# Patient Record
Sex: FEMALE | Race: NATIVE HAWAIIAN OR OTHER PACIFIC ISLANDER | Employment: PART TIME | URBAN - METROPOLITAN AREA
[De-identification: names, ages, dates, MRNs, and addresses within clinical notes are randomized per-mention and may not be internally consistent; named-entity substitution may affect disease eponyms.]

---

## 2017-08-04 ENCOUNTER — HOSPITAL ENCOUNTER (INPATIENT)
Age: 22
LOS: 3 days | Discharge: HOME | DRG: 885 | End: 2017-08-07
Attending: EMERGENCY MEDICINE | Admitting: PSYCHIATRY & NEUROLOGY
Payer: OTHER GOVERNMENT
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
## 2017-08-04 DIAGNOSIS — R45.851 SUICIDAL IDEATION: Primary | ICD-10-CM

## 2017-08-04 PROBLEM — F31.9 BIPOLAR 1 DISORDER (HCC): Status: ACTIVE | Noted: 2017-08-04

## 2017-08-04 LAB
ALBUMIN SERPL BCP-MCNC: 3.9 G/DL (ref 3.4–5)
ALBUMIN/GLOB SERPL: 0.9 {RATIO} (ref 0.8–1.7)
ALP SERPL-CCNC: 66 U/L (ref 45–117)
ALT SERPL-CCNC: 18 U/L (ref 13–56)
AMPHET UR QL SCN: NEGATIVE
ANION GAP BLD CALC-SCNC: 6 MMOL/L (ref 3–18)
APAP SERPL-MCNC: <2 UG/ML (ref 10–30)
AST SERPL W P-5'-P-CCNC: 16 U/L (ref 15–37)
ATRIAL RATE: 74 BPM
BARBITURATES UR QL SCN: NEGATIVE
BASOPHILS # BLD AUTO: 0 K/UL (ref 0–0.1)
BASOPHILS # BLD: 0 % (ref 0–2)
BENZODIAZ UR QL: NEGATIVE
BILIRUB SERPL-MCNC: 0.2 MG/DL (ref 0.2–1)
BUN SERPL-MCNC: 13 MG/DL (ref 7–18)
BUN/CREAT SERPL: 14 (ref 12–20)
CALCIUM SERPL-MCNC: 8.9 MG/DL (ref 8.5–10.1)
CALCULATED P AXIS, ECG09: 50 DEGREES
CALCULATED R AXIS, ECG10: 48 DEGREES
CALCULATED T AXIS, ECG11: 45 DEGREES
CANNABINOIDS UR QL SCN: NEGATIVE
CHLORIDE SERPL-SCNC: 105 MMOL/L (ref 100–108)
CO2 SERPL-SCNC: 30 MMOL/L (ref 21–32)
COCAINE UR QL SCN: NEGATIVE
CREAT SERPL-MCNC: 0.92 MG/DL (ref 0.6–1.3)
DIAGNOSIS, 93000: NORMAL
DIFFERENTIAL METHOD BLD: ABNORMAL
EOSINOPHIL # BLD: 0.1 K/UL (ref 0–0.4)
EOSINOPHIL NFR BLD: 1 % (ref 0–5)
ERYTHROCYTE [DISTWIDTH] IN BLOOD BY AUTOMATED COUNT: 13.6 % (ref 11.6–14.5)
ETHANOL SERPL-MCNC: <3 MG/DL (ref 0–3)
GLOBULIN SER CALC-MCNC: 4.2 G/DL (ref 2–4)
GLUCOSE SERPL-MCNC: 107 MG/DL (ref 74–99)
HCG UR QL: NEGATIVE
HCT VFR BLD AUTO: 39.4 % (ref 35–45)
HDSCOM,HDSCOM: NORMAL
HGB BLD-MCNC: 13.4 G/DL (ref 12–16)
LYMPHOCYTES # BLD AUTO: 13 % (ref 21–52)
LYMPHOCYTES # BLD: 1 K/UL (ref 0.9–3.6)
MCH RBC QN AUTO: 28.5 PG (ref 24–34)
MCHC RBC AUTO-ENTMCNC: 34 G/DL (ref 31–37)
MCV RBC AUTO: 83.7 FL (ref 74–97)
METHADONE UR QL: NEGATIVE
MONOCYTES # BLD: 0.2 K/UL (ref 0.05–1.2)
MONOCYTES NFR BLD AUTO: 3 % (ref 3–10)
NEUTS SEG # BLD: 6.2 K/UL (ref 1.8–8)
NEUTS SEG NFR BLD AUTO: 83 % (ref 40–73)
OPIATES UR QL: NEGATIVE
P-R INTERVAL, ECG05: 132 MS
PCP UR QL: NEGATIVE
PLATELET # BLD AUTO: 259 K/UL (ref 135–420)
PMV BLD AUTO: 10.7 FL (ref 9.2–11.8)
POTASSIUM SERPL-SCNC: 3.4 MMOL/L (ref 3.5–5.5)
PROT SERPL-MCNC: 8.1 G/DL (ref 6.4–8.2)
Q-T INTERVAL, ECG07: 410 MS
QRS DURATION, ECG06: 84 MS
QTC CALCULATION (BEZET), ECG08: 455 MS
RBC # BLD AUTO: 4.71 M/UL (ref 4.2–5.3)
SALICYLATES SERPL-MCNC: <2.8 MG/DL (ref 2.8–20)
SODIUM SERPL-SCNC: 141 MMOL/L (ref 136–145)
VENTRICULAR RATE, ECG03: 74 BPM
WBC # BLD AUTO: 7.5 K/UL (ref 4.6–13.2)

## 2017-08-04 PROCEDURE — 80053 COMPREHEN METABOLIC PANEL: CPT | Performed by: EMERGENCY MEDICINE

## 2017-08-04 PROCEDURE — 65220000003 HC RM SEMIPRIVATE PSYCH

## 2017-08-04 PROCEDURE — 99284 EMERGENCY DEPT VISIT MOD MDM: CPT

## 2017-08-04 PROCEDURE — 80307 DRUG TEST PRSMV CHEM ANLYZR: CPT | Performed by: EMERGENCY MEDICINE

## 2017-08-04 PROCEDURE — 85025 COMPLETE CBC W/AUTO DIFF WBC: CPT | Performed by: EMERGENCY MEDICINE

## 2017-08-04 PROCEDURE — 74011250637 HC RX REV CODE- 250/637: Performed by: PSYCHIATRY & NEUROLOGY

## 2017-08-04 PROCEDURE — 93005 ELECTROCARDIOGRAM TRACING: CPT

## 2017-08-04 PROCEDURE — 81025 URINE PREGNANCY TEST: CPT | Performed by: EMERGENCY MEDICINE

## 2017-08-04 RX ORDER — HALOPERIDOL 5 MG/1
5 TABLET ORAL
Status: DISCONTINUED | OUTPATIENT
Start: 2017-08-04 | End: 2017-08-07 | Stop reason: HOSPADM

## 2017-08-04 RX ORDER — TRAZODONE HYDROCHLORIDE 50 MG/1
50 TABLET ORAL
Status: DISCONTINUED | OUTPATIENT
Start: 2017-08-04 | End: 2017-08-07 | Stop reason: HOSPADM

## 2017-08-04 RX ORDER — LORAZEPAM 2 MG/ML
1-2 INJECTION INTRAMUSCULAR
Status: DISCONTINUED | OUTPATIENT
Start: 2017-08-04 | End: 2017-08-07 | Stop reason: HOSPADM

## 2017-08-04 RX ORDER — LORAZEPAM 1 MG/1
1-2 TABLET ORAL
Status: DISCONTINUED | OUTPATIENT
Start: 2017-08-04 | End: 2017-08-07 | Stop reason: HOSPADM

## 2017-08-04 RX ORDER — ARIPIPRAZOLE 5 MG/1
5 TABLET ORAL DAILY
COMMUNITY

## 2017-08-04 RX ORDER — SERTRALINE HYDROCHLORIDE 50 MG/1
50 TABLET, FILM COATED ORAL DAILY
COMMUNITY

## 2017-08-04 RX ORDER — HALOPERIDOL 10 MG/1
5 TABLET ORAL
Status: DISCONTINUED | OUTPATIENT
Start: 2017-08-04 | End: 2017-08-04

## 2017-08-04 RX ORDER — HALOPERIDOL 5 MG/ML
5 INJECTION INTRAMUSCULAR
Status: DISCONTINUED | OUTPATIENT
Start: 2017-08-04 | End: 2017-08-07 | Stop reason: HOSPADM

## 2017-08-04 RX ADMIN — TRAZODONE HYDROCHLORIDE 50 MG: 50 TABLET ORAL at 20:38

## 2017-08-04 NOTE — ED PROVIDER NOTES
HPI Comments: 26 yo F w hx of bipolar, here with suicide attempt. Pt was found parked [place] the bottom of the [place] bridge and found at the top crying. Pt admits to prior suicide attempts, wrist cutting, and several days of med noncompliance with her abilify and zoloft. Recent attempt in May, was hospitalized at [facility]. Still with SI, would jump off bridge or overdose but denies any recent OD. No HI/AVH. Not compliant with psych and PMD follow up. Patient is a 25 y.o. female presenting with mental health disorder. The history is provided by the patient, the police and a parent. Mental Health Problem   The primary symptoms include dysphoric mood. The primary symptoms do not include hallucinations. This is a chronic problem. The onset of the illness is precipitated by emotional stress. Additional symptoms of the illness include abdominal pain. Additional symptoms of the illness do not include no headaches or no seizures. She admits to suicidal ideas. She contemplates harming herself. She does not contemplate injuring another person. She has not already  injured another person. Risk factors that are present for mental illness include a history of mental illness. No past medical history on file. No past surgical history on file. No family history on file. Social History     Social History    Marital status: N/A     Spouse name: N/A    Number of children: N/A    Years of education: N/A     Occupational History    Not on file. Social History Main Topics    Smoking status: Not on file    Smokeless tobacco: Not on file    Alcohol use Not on file    Drug use: Not on file    Sexual activity: Not on file     Other Topics Concern    Not on file     Social History Narrative         ALLERGIES: Review of patient's allergies indicates no known allergies. Review of Systems   Constitutional: Negative. Negative for chills and fever. HENT: Negative.   Negative for sore throat and trouble swallowing. Eyes: Negative. Negative for pain and visual disturbance. Respiratory: Negative. Negative for apnea, chest tightness and shortness of breath. Cardiovascular: Negative. Negative for chest pain and leg swelling. Gastrointestinal: Positive for abdominal pain. Negative for constipation and diarrhea. Endocrine: Negative. Negative for cold intolerance and heat intolerance. Genitourinary: Negative. Negative for difficulty urinating, dysuria and frequency. Musculoskeletal: Negative. Negative for back pain and joint swelling. Skin: Negative. Negative for rash and wound. Neurological: Negative for seizures and headaches. Psychiatric/Behavioral: Positive for dysphoric mood, self-injury and suicidal ideas. Negative for hallucinations. The patient is nervous/anxious. All other systems reviewed and are negative. There were no vitals filed for this visit. Physical Exam   Constitutional: She is oriented to person, place, and time. She appears well-developed and well-nourished. She appears distressed. HENT:   Head: Normocephalic and atraumatic. Eyes: Conjunctivae and EOM are normal.   Neck: Normal range of motion. Cardiovascular: Normal rate, regular rhythm and normal heart sounds. Exam reveals no gallop and no friction rub. No murmur heard. Pulmonary/Chest: Effort normal and breath sounds normal. No stridor. Abdominal: Soft. She exhibits no distension. There is no tenderness. Musculoskeletal: Normal range of motion. She exhibits no tenderness. Neurological: She is alert and oriented to person, place, and time. Skin: Skin is warm and dry. She is not diaphoretic. Psychiatric: Thought content is not paranoid. She expresses impulsivity. She expresses suicidal ideation. She expresses no homicidal ideation. She expresses suicidal plans. She expresses no homicidal plans. Nursing note and vitals reviewed.        MDM  Number of Diagnoses or Management Options  Suicidal ideation: established and worsening  Suicide attempt by inadequate means, initial encounter [facility]): established and worsening  Diagnosis management comments: Long hx of SI per mother- multiple attempts and hospitalizations. ECO per police - pt currently is not voluntary. Will let crisis team discuss with pt and mother while lab work for medical eval pends. Pt is tearful and acute risk to self but does not require restraints at the moment. Michelle Machado MD 2:09 PM    EKG interpretation- rate 74, NSR with sinus arrhythmia, no ST changes. Medically clear for proceding from ECO to TDO, appreciate psych/crisis assistance with disposition. Michelle Machado MD 3:34 PM    9694- pt to be admitted under TDO. Diet and admission orders placed.  -TF             Amount and/or Complexity of Data Reviewed  Clinical lab tests: reviewed and ordered  Tests in the medicine section of CPT®: ordered  Obtain history from someone other than the patient: (Mother  )    Risk of Complications, Morbidity, and/or Mortality  Presenting problems: high  Management options: high    Patient Progress  Patient progress: stable    ED Course       Procedures

## 2017-08-04 NOTE — BH NOTES
Pt's parents arrived to facility and requesting to visit with daughter. Pt's parent's currently in day area with pt. Pt observed to be tearful with mom.

## 2017-08-04 NOTE — IP AVS SNAPSHOT
[address] [address] Patient: Brionna Ornelas MRN: [id number] :1995 You are allergic to the following Allergen Reactions Peanut Anaphylaxis Recent Documentation Height Weight Breastfeeding? BMI  
  
 1.499 m (2 %, Z= -2.07)* 49.9 kg (15 %, Z= -1.04)* No 22.22 kg/m2 (56 %, Z= 0.15)* *Growth percentiles are based on CDC 2-20 Years data. Emergency Contacts Name Discharge Info Relation Home Work Mobile Andria George DISCHARGE CAREGIVER [3] Parent [1] [phone] About your hospitalization You were admitted on:  2017 You last received care in the:  [facility] ADULT CHEM DEP You were discharged on:  2017 Unit phone number:  [phone] Why you were hospitalized Your primary diagnosis was:  Not on File Your diagnoses also included:  Bipolar 1 Disorder (Hcc) Providers Seen During Your Hospitalizations Provider Role Specialty Primary office phone Maximiliano Holm MD Attending Provider Emergency Medicine [phone] Payal Martinez MD Attending Provider Psychiatry [phone] Your Primary Care Physician (PCP) Primary Care Physician Office Phone Office Fax Chayito Brie [phone] Follow-up Information Follow up With Details Comments Contact Info Sydnie Berry MD   [address] [address] [address] [facility] [address] 
[phone] Current Discharge Medication List  
  
ASK your doctor about these medications Dose & Instructions Dispensing Information Comments Morning Noon Evening Bedtime ABILIFY 5 mg tablet Generic drug:  ARIPiprazole Your last dose was: Your next dose is:    
   
   
 Dose:  5 mg Take 5 mg by mouth daily. Refills:  0  
     
   
   
   
  
 ZOLOFT 50 mg tablet Generic drug:  sertraline Your last dose was: Your next dose is:    
   
   
 Dose:  50 mg Take 50 mg by mouth daily. Refills:  0 Discharge Instructions BEHAVIORAL HEALTH NURSING DISCHARGE NOTE PATIENT INSTRUCTIONS: 
 
Diet: Regular The discharge information has been reviewed with the patient. The patient verbalized understanding. Patient armband removed and shredded. Discharge Orders None Introducing [facility] SERVICES! [facility] introduces [employer] patient portal. Now you can access parts of your medical record, email your doctor's office, and request medication refills online. 1. In your internet browser, go to [email or web address] 2. Click on the First Time User? Click Here link in the Sign In box. You will see the New Member Sign Up page. 3. Enter your [employer] Access Code exactly as it appears below. You will not need to use this code after youve completed the sign-up process. If you do not sign up before the expiration date, you must request a new code. · [employer] Access Code: [email or web address] Expires: 11/5/2017  8:35 AM 
 
4. Enter the last four digits of your Social Security Number (xxxx) and Date of Birth (mm/dd/yyyy) as indicated and click Submit. You will be taken to the next sign-up page. 5. Create a [employer] ID. This will be your [employer] login ID and cannot be changed, so think of one that is secure and easy to remember. 6. Create a [employer] password. You can change your password at any time. 7. Enter your Password Reset Question and Answer. This can be used at a later time if you forget your password. 8. Enter your e-mail address. You will receive e-mail notification when new information is available in [address] 9. Click Sign Up. You can now view and download portions of your medical record. 10. Click the Download Summary menu link to download a portable copy of your medical information. If you have questions, please visit the Frequently Asked Questions section of the MyChart website. Remember, MyChart is NOT to be used for urgent needs. For medical emergencies, dial 911. Now available from your iPhone and Android! General Information Please provide this summary of care documentation to your next provider. Patient Signature:  ____________________________________________________________ Date:  ____________________________________________________________  
  
Larri Hazard Provider Signature:  ____________________________________________________________ Date:  ____________________________________________________________

## 2017-08-04 NOTE — ED TRIAGE NOTES
Pt arrives in police custody for ECO after parents found car at the parking lot at the bottom of the [place] bridge. Pt was sitting at the edge of the bridge contemplating jumping off. Pt with hx of self-harm and hx of overdose.

## 2017-08-04 NOTE — ED NOTES
TRANSFER - OUT REPORT:    Verbal report given to Paulie Sandoval, RN (name) on Anju George  being transferred to [facility] Behavior (unit) for routine progression of care       Report consisted of patients Situation, Background, Assessment and   Recommendations(SBAR). Information from the following report(s) SBAR, Kardex, ED Summary, Intake/Output, MAR and Recent Results was reviewed with the receiving nurse. Lines:       Opportunity for questions and clarification was provided.       Patient transported with:   ED STAFF/ [place] [employer]

## 2017-08-04 NOTE — BH NOTES
Pt is a 25year old  female admitted as a [place] TDO for suicidal ideation. Pt arrived to unit accompanied by security and ED tech. Pt guarded during assessment; mood and affect angry. TDO process explained to pt who verbalized understanding. Pt stated that police [facility] \"[facility] I chickened out from jumping because I thought I would break my bones instead of die. \" Pt endorses previous psych admissions in [place] times 2 and VB in May of this year for suicidal gestures via OD. Pt does verbalize history of self injurious behaviors to include cutting left wrist, several scarred areas noted. Pt stated that she uses \"anything to cut\" but stated she has not \"cut in a long time. \" Pt states while at [facility] she was started on regimen of Zoloft 50 mg and Abilify 5 mg, however pt has been non-compliant with meds stating \"I don't want to commit to taking a pill everyday. \" Pt refused to identify triggering event for recent suicidal thoughts stating \"A lot of things happened. I don't want to talk about it. \" Pt stated \"I don't even know how the police found me, I turned my location services off and quit sending her my location. \" Throughout assessment pt noted to be tearing paper into little pieces. Pt denies drug use. Pt stated she drinks \"occassionally 1-2 times a week. \" Pt denies use of nicotine. Pt denies history of abuse. Pt denies chronic medical problems. Pt states she has an allergy to peanuts but stated she \"can eat almonds and cashews. \"     Pt was oriented to unit rules and expectations and verbalized understanding. Pt did not voice any concerns and requested to make phone call stating she wanted to call her friends and not her parents. Safety search for contraband conducted. Pt placed on suicide precautions with rounds maintained Q 15 mins.

## 2017-08-05 LAB
ALBUMIN SERPL BCP-MCNC: 3.8 G/DL (ref 3.4–5)
ALBUMIN/GLOB SERPL: 1.1 {RATIO} (ref 0.8–1.7)
ALP SERPL-CCNC: 64 U/L (ref 45–117)
ALT SERPL-CCNC: 16 U/L (ref 13–56)
ANION GAP BLD CALC-SCNC: 8 MMOL/L (ref 3–18)
AST SERPL W P-5'-P-CCNC: 14 U/L (ref 15–37)
BASOPHILS # BLD AUTO: 0 K/UL (ref 0–0.06)
BASOPHILS # BLD: 1 % (ref 0–2)
BILIRUB SERPL-MCNC: 0.2 MG/DL (ref 0.2–1)
BUN SERPL-MCNC: 11 MG/DL (ref 7–18)
BUN/CREAT SERPL: 14 (ref 12–20)
CALCIUM SERPL-MCNC: 8.7 MG/DL (ref 8.5–10.1)
CHLORIDE SERPL-SCNC: 106 MMOL/L (ref 100–108)
CO2 SERPL-SCNC: 26 MMOL/L (ref 21–32)
CREAT SERPL-MCNC: 0.79 MG/DL (ref 0.6–1.3)
DIFFERENTIAL METHOD BLD: ABNORMAL
EOSINOPHIL # BLD: 0.4 K/UL (ref 0–0.4)
EOSINOPHIL NFR BLD: 6 % (ref 0–5)
ERYTHROCYTE [DISTWIDTH] IN BLOOD BY AUTOMATED COUNT: 13.5 % (ref 11.6–14.5)
GLOBULIN SER CALC-MCNC: 3.5 G/DL (ref 2–4)
GLUCOSE SERPL-MCNC: 101 MG/DL (ref 74–99)
HCT VFR BLD AUTO: 38.9 % (ref 35–45)
HGB BLD-MCNC: 13 G/DL (ref 12–16)
LYMPHOCYTES # BLD AUTO: 27 % (ref 21–52)
LYMPHOCYTES # BLD: 1.8 K/UL (ref 0.9–3.6)
MCH RBC QN AUTO: 28 PG (ref 24–34)
MCHC RBC AUTO-ENTMCNC: 33.4 G/DL (ref 31–37)
MCV RBC AUTO: 83.7 FL (ref 74–97)
MONOCYTES # BLD: 0.4 K/UL (ref 0.05–1.2)
MONOCYTES NFR BLD AUTO: 6 % (ref 3–10)
NEUTS SEG # BLD: 3.9 K/UL (ref 1.8–8)
NEUTS SEG NFR BLD AUTO: 60 % (ref 40–73)
PLATELET # BLD AUTO: 237 K/UL (ref 135–420)
PMV BLD AUTO: 10.6 FL (ref 9.2–11.8)
POTASSIUM SERPL-SCNC: 3.8 MMOL/L (ref 3.5–5.5)
PROT SERPL-MCNC: 7.3 G/DL (ref 6.4–8.2)
RBC # BLD AUTO: 4.65 M/UL (ref 4.2–5.3)
SODIUM SERPL-SCNC: 140 MMOL/L (ref 136–145)
TSH SERPL DL<=0.05 MIU/L-ACNC: 1.06 UIU/ML (ref 0.36–3.74)
WBC # BLD AUTO: 6.6 K/UL (ref 4.6–13.2)

## 2017-08-05 PROCEDURE — 65220000003 HC RM SEMIPRIVATE PSYCH

## 2017-08-05 PROCEDURE — 85025 COMPLETE CBC W/AUTO DIFF WBC: CPT | Performed by: PSYCHIATRY & NEUROLOGY

## 2017-08-05 PROCEDURE — 36415 COLL VENOUS BLD VENIPUNCTURE: CPT | Performed by: PSYCHIATRY & NEUROLOGY

## 2017-08-05 PROCEDURE — 80053 COMPREHEN METABOLIC PANEL: CPT | Performed by: PSYCHIATRY & NEUROLOGY

## 2017-08-05 PROCEDURE — 84443 ASSAY THYROID STIM HORMONE: CPT | Performed by: PSYCHIATRY & NEUROLOGY

## 2017-08-05 PROCEDURE — 74011250637 HC RX REV CODE- 250/637: Performed by: PSYCHIATRY & NEUROLOGY

## 2017-08-05 RX ADMIN — TRAZODONE HYDROCHLORIDE 50 MG: 50 TABLET ORAL at 22:03

## 2017-08-05 NOTE — PROGRESS NOTES
Problem: Suicide/Homicide (Adult/Pediatric)  Goal: *STG: Remains safe in hospital  Pt will not engage in any self injurious behaviors during hospitalization   Outcome: Progressing Towards Goal  Pt has not engaged in any self injurious behaviors  Goal: *STG: Attends activities and groups  Pt will attend and participate in 3 scheduled groups daily   Outcome: Not Progressing Towards Goal  Pt has refused to attend groups despite staff encouragment    Comments:   Pt isolating to self in room for majority of shift. Pt denies SI. Pt does appear superficial in presentation and continues to deny need for hospitalization. Pt remains on suicide precautions with rounds maintained Q 15 mins for safety.  Staff will continue to offer a safe and supportive environment

## 2017-08-05 NOTE — H&P
Psychiatry History and Physical    Subjective:     Date of Evaluation:  8/5/2017    Reason for Referral:  Toribio Andres was referred to the examiners from ER/MV for SI-[place] of bridge. History of Presenting Problem: 22y/o Female in NAD well developed and nourished. TDO admit. Recently discharged from  Psych. UDS-negative. Medical problems;Depression, Mood disorder and self cutter-noted old lac to left wrist    Patient Active Problem List    Diagnosis Date Noted    Bipolar 1 disorder ([facility] [address] 08/04/2017     No past medical history on file. No past surgical history on file. No family history on file. Social History   Substance Use Topics    Smoking status: Not on file    Smokeless tobacco: Not on file    Alcohol use Not on file     Prior to Admission medications    Medication Sig Start Date End Date Taking? Authorizing Provider   sertraline (ZOLOFT) 50 mg tablet Take 50 mg by mouth daily. Historical Provider   ARIPiprazole (ABILIFY) 5 mg tablet Take 5 mg by mouth daily. Historical Provider     No Known Allergies     Review of Systems - History obtained from chart review and the patient  General ROS: positive for  - denies  Psychological ROS: positive for - depression  Respiratory ROS: no cough, shortness of breath, or wheezing  Cardiovascular ROS: no chest pain or dyspnea on exertion  Gastrointestinal ROS: no abdominal pain, change in bowel habits, or black or bloody stools  Genito-Urinary ROS: no dysuria, trouble voiding, or hematuria  Musculoskeletal ROS: negative  Neurological ROS: no TIA or stroke symptoms  Dermatological ROS: old superficial lac to left wrist-self inflicted      Objective:     No data found.       Mental Status exam: WNL except for    Sensorium  oriented to time, place and person   Orientation person, place, time/date and situation   Relations cooperative   Eye Contact appropriate   Appearance:  age appropriate and within normal Limits   Motor Behavior:  gait stable and within normal limits   Speech:  soft   Vocabulary average   Thought Process: logical   Thought Content free of delusions and free of hallucinations   Suicidal ideations no plan  and no intention   Homicidal ideations no plan  and no intention   Mood:  depressed   Affect:  depressed and sad   Memory recent  adequate   Memory remote:  adequate   Concentration:  adequate   Abstraction:  concrete   Insight:  good   Reliability good   Judgment:  good           Physical Exam:   Visit Vitals    /73 (BP 1 Location: Right arm, BP Patient Position: At rest)    Pulse 70    Temp 98.2 °F (36.8 °C)    Resp 18    Ht 4' 11\" (1.499 m)    Wt 49.9 kg (110 lb)    SpO2 100%    Breastfeeding No    BMI 22.22 kg/m2     General appearance: alert, cooperative, no distress, appears stated age  Head: Normocephalic, without obvious abnormality, atraumatic  Eyes: negative  Ears: normal TM's and external ear canals AU  Nose: Nares normal. Septum midline. Mucosa normal. No drainage or sinus tenderness. Throat: Lips, mucosa, and tongue normal. Teeth and gums normal  Neck: supple, symmetrical, trachea midline, no adenopathy, thyroid: not enlarged, symmetric, no tenderness/mass/nodules, no carotid bruit and no JVD  Back: symmetric, no curvature. ROM normal. No CVA tenderness. Lungs: clear to auscultation bilaterally  Heart: regular rate and rhythm, S1, S2 normal, no murmur, click, rub or gallop  Abdomen: soft, non-tender.  Bowel sounds normal. No masses,  no organomegaly  Extremities: extremities normal, atraumatic, no cyanosis or edema  Pulses: 2+ and symmetric  Skin: Skin color, texture, turgor normal. No rashes or lesions or has superficial lac-old to left wrist-self inflicted  Lymph nodes: Cervical, supraclavicular, and axillary nodes normal.  Neurologic: Grossly normal        Impression:    Active Problems:    Bipolar 1 disorder ([facility] [address] (8/4/2017)          Plan:     Recommendations for Treatment/Conditions:  Psychiatric treatment recommended while in hospital  Admit to Psych services for SI                                                  Depression                                                  Mood disorder-non-compliant with her meds    Referral To: Inpatient psychiatric care    Competency Statement: It is recommended that the family or other concerned individuals be consulted for substituted judgement if deemed incompetent.  [email or web address]      [employer], NP   8/5/2017 9:51 AM

## 2017-08-05 NOTE — BH NOTES
GROUP THERAPY PROGRESS NOTE    Anju George is not participating in Recreational Therapy. Group time: 3052    Personal goal for participation: fresh air break    Goal orientation: passive    Group therapy participation: refuse    Therapeutic interventions reviewed and discussed: Staff encouraged Pt to participate in group. Impression of participation: Pt refuse chose to rest in bed despite staff encouragement.

## 2017-08-05 NOTE — H&P
[address] [facility] ADMIT NOTE-P    Name:  Hang Centeno  MR#:  [id number]  :  1995  Account #:  [de-identified]  Date of Adm:  2017      CHIEF COMPLAINT: \"The police took me here after I was sitting on  the bridge. \"    HISTORY OF PRESENT ILLNESS: This is a 53-year-old female with  history of unspecified depressive disorder, borderline personality  disorder traits and self-injurious behaviors, who presents to the  hospital after texting family and friends that she wanted to end her life  and being found on a bridge by police and bystanders. She is a TDO  by police at this time. This is the fourth hospitalization for this patient in  the last 5 years for similar behaviors. Prior she overdosed on a handful  of pills as well as eating PEANUT BUTTER, for which SHE IS  ALLERGIC TO. The patient seen this morning. She is irritable,  frustrated that she has to be in the hospital. Says she wants to go  home. She denies suicidal ideation. She admits that she has been  feeling fine. She has been in therapy not taking any daily psychiatric  medications, but \"was doing pretty good\". She says she has not cut  herself since May. 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 However, after texting her family and friends and going to the  bridge, she looked at the water and decided that she did want to live  and that she had been sitting on the bridge for quite some time before  the police got there, without acting on any impulses.  She denies  suicidal ideation, homicidal ideation at this time. She has no evidence  of psychosis. No evidence of tasia at this time. PSYCHIATRIC HISTORY: She has been diagnosed with depression,  anxiety. She sees a therapist at [address] [place] [address]. She sees Ms. Austin Justice. She has been in the hospital 4 times, last  hospitalized in May of this year in [place]. Never been at [facility]. SUICIDE ATTEMPTS: She has had 3 prior, 2 events where she  overdosed on pills and once where she took [address]: She has been prescribed Zoloft but has a  difficult time taking daily medications and not interested in medications  at this time. PAST MEDICAL/SURGICAL HISTORY: No acute or chronic medical  problems. ALLERGIES: SHE IS ALLERGIC TO PEANUTS. NO DRUG  ALLERGIES. FAMILY HISTORY OF MENTAL ILLNESS: Mother's side, her mother  has anxiety. Sibling, no issues. SOCIAL HISTORY: The patient grew up in [place], the oldest  of 3 children, has a younger brother, younger sister to an intact union  of mother and father. She denies any physical or sexual abuse, but  says that her mom is \"very critical\", also endorses being bullied when  she was a kid, though does not give details on what the bullying was. Currently, lives with her parents. She graduated high school and is a  student at [employer], but she is off for the summer going to start classes this  fall. She is now single. No children. She does work at a movie theater  part-time and no acute legal issues. SUBSTANCE USE: She uses marijuana recreationally. She frequently  drinks 2-3 nights per week and drinks \"to get drunk\" but does not  quantify how much this is of alcohol. No other illicit drug use or IV drug  use in the past.    REVIEW OF SYSTEMS: Reviewed 10 organ systems. They are  negative except as noted in the HPI.     MENTAL STATUS EXAMINATION  APPEARANCE: She is dressed in casual clothes with good grooming  and hygiene. Appears stated age. BEHAVIOR: She is calm, cooperative with good eye contact, lying in  bed. MOTOR: No psychomotor agitation or retardation. SPEECH: Is normal rate, tone, and volume. MOOD: Is \"I am better now. \" Affect is euthymic, full. THOUGHT PROCESS: Linear, goal directed. THOUGHT CONTENT: She denies suicidal or homicidal ideation. PERCEPTION: Denies auditory or visual hallucinations. Concentration  is fair. Memory is good. Cognition is alert and oriented. Insight is fair,  judgment is fair. RISK ASSESSMENT  PRIOR ATTEMPTS: Yes, 3. Prior lethality attempts, yes. Current  ideation plan, no.  WEAPONS AT HOME: No.  ALCOHOL, DRUG USE: Positive for marijuana use. PROTECTIVE FACTORS: Limited. FUTURE ORIENTATION: Also limited. ASSESSMENT: This is a 53-year-old female, history of unspecified  depressive disorder, anxiety disorder and borderline personality  disorder traits and self-injurious behavior, who presents after being  found on the bridge in a suicidal gesture with a history of impulsivity  and poor distress tolerance is suggestive of more borderline  personality disorder causing acute symptoms, rather than a depressive  or anxiety disorder. Nonetheless, anxiety appears to be somewhat  debilitating her in acute attacks, although does not meet criteria for  panic disorder and no history of panic attacks. Although depressive  symptoms likely have been distressing for her in the past and history of  cutting is concerning for more self-injurious behaviors. DIAGNOSES  1. Depressive disorder, unspecified. 2. Generalized anxiety disorder. 3. Borderline personality disorder. PLAN  1. Continue with inpatient psychiatric treatment for safety stabilization  medication management. 2. Continue with suicide and assault precautions. 3. Continue with group therapy on the unit. 4. Will need to get more collateral from outpatient providers and family  friends. 5. Medications:  At this time the patient not interested in daily  medications and has a history of noncompliance with medications. Will  discuss with the patient further about medication management. For  now will hold medications. 6. Labs are reviewed. 7. Social work to help with disposition. 8. Estimated length of stay 5-7 days. PAULNIA Jenkins MD    SR / PAG  D:  08/05/2017   11:20  T:  08/05/2017   13:23  Job #:  [id number]

## 2017-08-05 NOTE — BH NOTES
Patient spent the majority of this shift in bed, tearful at times. Patient was offered support and comfort from staff. Patient stated she is upset about being on a TDO. Patient presented in day room for lunch and to make a phone call. Will continue to monitor per safety precautions.

## 2017-08-05 NOTE — BH NOTES
GROUP THERAPY PROGRESS NOTE    Anju Tamezmarianela is not  participating in [employer]. Staff encouraged and pt refused.

## 2017-08-06 PROCEDURE — 65220000003 HC RM SEMIPRIVATE PSYCH

## 2017-08-06 PROCEDURE — 74011250637 HC RX REV CODE- 250/637: Performed by: PSYCHIATRY & NEUROLOGY

## 2017-08-06 RX ADMIN — TRAZODONE HYDROCHLORIDE 50 MG: 50 TABLET ORAL at 22:01

## 2017-08-06 NOTE — BH NOTES
GROUP THERAPY PROGRESS NOTE    Anju George is not participating in Recreational Therapy. Group time: 45 minutes    Patient did not attend recreation group outside despite staff encouragement. Patient chose to socialize with peers out in the day area.

## 2017-08-06 NOTE — BH NOTES
GROUP THERAPY PROGRESS NOTE    Anju George is participating in Recreational Therapy. Group time: 1030    Personal goal for participation: fresh air break/games on the unit    Goal orientation: social    Group therapy participation: active    Therapeutic interventions reviewed and discussed: Staff encouraged Pt to get off the unit and go outside and get some fresh air, or play games on the unit with peers. Impression of participation: Pt chose to go off the unit play games in the recreation room, and walk outside for fresh air.

## 2017-08-06 NOTE — BH NOTES
GROUP THERAPY PROGRESS NOTE    Anju George is not participating in [place]. Staff encouraged and pt refused.

## 2017-08-06 NOTE — BH NOTES
Patient has been visible in the day area interacting with staff and peers. Pt. denies SI/HI. AV/H. Pt contracts for safety on the unit. Pt.denies any new medical/pain complaints. Pt. Received a visit from a male friend. Staff encouraged Pt. to continue participating in treatment and  medication therapy. Pt agreed. Pt. remain free of falls and provided non skid socks. Staff will continue to monitor Pt. for behavior safety and location.

## 2017-08-06 NOTE — BH NOTES
Psychiatric Progress Note    Date: 17  Patient Name: Magdaleno Weinstein  : 1995  MRN: [id number]  Hospital Day: 3      INTERVAL HISTORY:   Patient is calm, cooperative, conversational this morning. Discussed feeling \"left out\" of friend's [place] and this being a trigger for her. Future oriented, wanting to talk to friends who came to visit her from out of town. Has no desire to die, but says she wishes emotions would \"go away\" sometimes. Denies depressed mood, hopelessness, worthlessness, guilt or other depressive ssx. Not suicidal/ homicidal at this time. MENTAL STATUS EXAM:  Appearance: Dressed in hospital gown with good grooming and hygiene  Behavior: Cooperative with good eye contact, conversational  Motor: No psychomotor agitation/retardation  Speech: Normal rate, tone and volume  Mood: \"good \"  Affect: euthymic, full  Thought Process: linear, goal-directed  Thought Content: Denies SI and HI  Perception: Denies AH or VH  Concentration: fair  Memory: fair  Cognition: Alert and oriented  Insight: Fair  Judgment: Fair    RISK ASSESSMENT:   Prior Attempts: no noted prior  Lethality of Attempts: none noted prior  Current Ideation/Plan: denies  Protective Factors: limited  Future Orientation: limited    ASSESSMENT:   This is a 37-year-old female, history of unspecified  depressive disorder, anxiety disorder and borderline personality  disorder traits and self-injurious behavior, who presents after being  found on the bridge in a suicidal gesture with a history of impulsivity  and poor distress tolerance is suggestive of more borderline  personality disorder causing acute symptoms, rather than a depressive  or anxiety disorder. Nonetheless, anxiety appears to be somewhat  debilitating her in acute attacks, although does not meet criteria for  panic disorder and no history of panic attacks.  Although depressive  symptoms likely have been distressing for her in the past and history of  cutting is concerning for more self-injurious behaviors. Diagnoses:  1. Depressive disorder, unspecified. 2. Generalized anxiety disorder. 3. Borderline personality disorder. Plan:  1. Continue with inpatient psychiatric treatment for containment, stabilization and medication management  2. Patient is to continue with group therapy  3. Medications: hold psychotropics for now  4. SW to help with disposition  5.  Dispo- pending TDO hearing tomorrow

## 2017-08-07 VITALS
HEART RATE: 72 BPM | TEMPERATURE: 97.4 F | HEIGHT: 59 IN | OXYGEN SATURATION: 100 % | RESPIRATION RATE: 16 BRPM | BODY MASS INDEX: 22.18 KG/M2 | SYSTOLIC BLOOD PRESSURE: 110 MMHG | DIASTOLIC BLOOD PRESSURE: 72 MMHG | WEIGHT: 110 LBS

## 2017-08-07 NOTE — DISCHARGE INSTRUCTIONS
BEHAVIORAL HEALTH NURSING DISCHARGE NOTE      PATIENT INSTRUCTIONS:    Diet: Regular    The discharge information has been reviewed with the patient. The patient verbalized understanding. Patient armband removed and shredded.

## 2017-08-07 NOTE — DISCHARGE SUMMARY
Psychiatric Discharge Summary    Date: 17   Patient Name: Samantha Dorantes  : 1995  MRN: [id number]  Admission Date: 2017  Discharge Date: 17    HISTORY OF PRESENT ILLNESS: This is a 53-year-old female with  history of unspecified depressive disorder, borderline personality  disorder traits and self-injurious behaviors, who presents to the  hospital after texting family and friends that she wanted to end her life  and being found on a bridge by police and bystanders. She is a TDO  by police at this time. This is the fourth hospitalization for this patient in  the last 5 years for similar behaviors. Prior she overdosed on a handful  of pills as well as eating PEANUT BUTTER, for which SHE IS  ALLERGIC TO. The patient seen this morning. She is irritable,  frustrated that she has to be in the hospital. Says she wants to go  home. She denies suicidal ideation. She admits that she has been  feeling fine. She has been in therapy not taking any daily psychiatric  medications, but \"was doing pretty good\". She says she has not cut  herself since May. 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 However, after texting her family and friends and going to the  bridge, she looked at the water and decided that she did want to live  and that she had been sitting on the bridge for quite some time before  the police got there, without acting on any impulses. She denies  suicidal ideation, homicidal ideation at this time. She has no evidence  of psychosis.  No evidence of tasia at this time.    HOSPITAL COURSE:   Once on the unit, patient was calm, cooperative, wishing to go home, though because she was on TDO, was explained to patient that she  She denied SI and denied depressive ssx while on unit. On day of discharge, patient was discharged from court hearing for TDO. MENTAL STATUS EXAM:  Orientation: oriented to person, place, time, and situation  Appearance: Dressed in hospital gown with good grooming and hygiene  Behavior: Cooperative with good eye contact  Motor: No psychomotor agitation/retardation  Speech: Normal rate, tone and volume  Mood: \"fine\"  Affect: euthymic  Thought Process: linear, goal-directed  Thought Content: Denies SI and HI  Perception: Denies AH or VH  Concentration: fair  Memory: fair  Cognition: Alert and oriented  Insight: fair  Judgment: fair    ASSESSMENT at time of discharge:   Stable, not suicidal/ homicidal    Diagnoses:  1. Depressive disorder, unspecified. 2. Generalized anxiety disorder. 3. Borderline personality disorder. Discharge Instructions:  1. Please make all follow up appointments with doctors and , as provided by inpatient behavioral health . 2. If you feel unsafe or begin experiencing suicidal thoughts again, please call 9-1-1 or return to the nearest emergency department. Disposition:  Home with outpatient follow-up      Jacquelyn King MD

## 2017-08-07 NOTE — BH NOTES
Patient discharged at [address] with her belongings per court. She has copy of discharge instructions and card with numbers to remember. She denies SI/HI/AVH. She states her friend, Meghan Graves, will come to pick her up. She is encouraged to follow-up with therapist and states that she has an appointment today at 1700. She was brought to the front lobby by this writer where she will wait for her friend to pick her up.

## 2024-03-14 NOTE — PROGRESS NOTES
Problem: Suicide/Homicide (Adult/Pediatric)  Goal: *STG: Remains safe in hospital  Pt will not engage in any self injurious behaviors during hospitalization   Outcome: Progressing Towards Goal  aeb patient verbally enies thoughts of suicide  Goal: *STG/LTG: Complies with medication therapy  Pt will comply with prescribed medications daily   Outcome: Progressing Towards Goal  aeb patient taking medications as ordered    Comments:   Patient is alert and oriented x 3. She has been observed sitting in the day room watching TV, playing board games, and talking to her peers and staff. She also ad visitors and was observed sitting and talking appropriately with them. She reports she literally was feeling better the day she came in and has felt suicidal since then. She goes on to say her biggest fault is not taking her medications as ordered on a daily basis. She expressed interst in taking some type of lom=ng term medications where it is not required to take a pill every day. Denies hallucinations. Staff continues to monitor for safety and provide a therapeutic environment. Yes